# Patient Record
(demographics unavailable — no encounter records)

---

## 2024-10-21 NOTE — HISTORY OF PRESENT ILLNESS
[Sudden] : sudden [3] : 3 [Sharp] : sharp [Occasional] : occasional [de-identified] : This is Ms. CIARA URBINA a 58 year old female who comes in today complaining of left knee pain for a few months with no injury. feels like the left knee is buckling randomly. pain descending stairs. may have done PT for it. unsure if it was helpful.   h/o low back pain [] : no

## 2024-10-21 NOTE — IMAGING
[Left] : left knee [All Views] : anteroposterior, lateral, skyline, and anteroposterior standing [Mild tricompartmental OA medial narrowing] : Mild tricompartmental OA medial narrowing [Mild patellofemoral OA] : Mild patellofemoral OA [de-identified] :   ----------------------------------------------------------------------------   Left knee exam:   Skin: no significant pertinent finding  Inspection:     (neg) Effusion     (neg) Malalignment     (neg) Swelling     (neg) Quad atrophy     (neg) J-sign  ROM:     0 - 150 degrees of flexion.  Tenderness:   + lateral fem condyle      (neg) MJLT     (mild) LJLT     (neg) Medial patellar facet tenderness     (+) Lateral patellar facet tenderness     (neg) Crepitus     (+) Patellar grind tenderness     (neg) Patellar tendon     (neg) Quad tendon     Other:  Stability:     (good endpoint) Lachman     (neg) Varus/Valgus instability     (neg) Posterior drawer     (neg) Patellar translation: wnl  Additional tests:     (+) McMurrays test     (neg) Patellar apprehension     Other:  Strength: 5/5 Q/H/TA/GS/EHL  Neuro: In tact to light touch throughout, DTR's wnl  Vascularity: Extremity warm and well perfused  Gait: normal

## 2024-10-21 NOTE — DISCUSSION/SUMMARY
[Medication Risks Reviewed] : Medication risks reviewed [de-identified] : Discussed PT, nsaids, CSI HEP - IT band stretches  PT rx avoid incline activities, activities with repetitive strain voltaren gel TID f/u 6-8 weeks ----------------------------------------------------------------------------   All relevant imaging studies pertinent to today's visit, including x-rays, MRI's and/or other advanced imaging studies (CT/etc) were independently interpreted and reviewed with the patient as needed. Implications of the studies together with the patient's clinical picture were discussed to formulate a working diagnosis and management options were detailed.   The patient and/or guardian was advised of the diagnosis.  The natural history of the pathology was explained in full. All questions were answered.  The risks and benefits of conservative and interventional treatment alternatives were explained to the patient  The patient and/or guardian was advised if any advanced diagnostic/imaging study (MRI/CT/etc) is ordered to evaluate potential pathology in the affected area(s), they should follow up in the office to review the results of the study and determine further management that may be indicated.

## 2025-03-13 NOTE — PHYSICAL EXAM
[Bilateral] : foot and ankle bilaterally [] : toe tenderness [2nd] : 2nd [NL (40)] : MTP joint DF 40 degrees [NL (20)] : MTP joint PF 20 degrees [FreeTextEntry3] : sl prominence mtp hallux bilat, L 2nd pip lateral deviation [FreeTextEntry8] : L

## 2025-03-13 NOTE — HISTORY OF PRESENT ILLNESS
[de-identified] : 8/24/07 Patient is a 41-year-old female who comes in with complaints of pain about her left ankle following a traumatic injury on 06/17/07. She claims that she was coming out of a car and she stepped into an area of broken concrete of a manhole cover suffering a twisting injury to her left foot and ankle. No prior history of injury. She initially had pain, swelling and difficulty ambulating. She initially scored her pain as an 8 on a 1 to 10 scale. She has since progressed from a walker to a Cam boot now to an Aircast and physical therapy.   03/13/2025: CIARA DENTITCH a 59 year old female here for evaluation of her b/l feet. Patient states she has been having pain on the feet/hallux mtp  when she bends her feet for the past year. States left is worse than right foot. Pain localized along the forefoot. States she had hammer toe surgery in her 20s  l 2nd toe w/residual deformity. WB in boots [FreeTextEntry1] : b/l feet.

## 2025-03-13 NOTE — IMAGING
[Bilateral] : foot bilaterally [de-identified] : L > R hallux mtp djd w/dorsal spur and chevron head.  L 2nd pip artrhroplasty w 45 lateral angulation

## 2025-03-13 NOTE — DISCUSSION/SUMMARY
[de-identified] : Discussed revision pip arthroplasty L 2nd, cheilectomy/phal osteotomy  Discussed PT for L knee (seen by Dr. Osborn).